# Patient Record
Sex: FEMALE | Race: WHITE | NOT HISPANIC OR LATINO | Employment: OTHER
[De-identification: names, ages, dates, MRNs, and addresses within clinical notes are randomized per-mention and may not be internally consistent; named-entity substitution may affect disease eponyms.]

---

## 2020-10-01 ENCOUNTER — OFFICE VISIT (OUTPATIENT)
Age: 55
End: 2020-10-01

## 2020-12-24 ENCOUNTER — OFFICE VISIT (OUTPATIENT)
Age: 55
End: 2020-12-24

## 2021-01-25 ENCOUNTER — OFFICE VISIT (OUTPATIENT)
Age: 56
End: 2021-01-25

## 2021-04-08 ENCOUNTER — OFFICE VISIT (OUTPATIENT)
Dept: URBAN - METROPOLITAN AREA CLINIC 7 | Facility: CLINIC | Age: 56
End: 2021-04-08

## 2021-04-08 ENCOUNTER — TELEPHONE ENCOUNTER (OUTPATIENT)
Dept: URBAN - METROPOLITAN AREA CLINIC 9 | Facility: CLINIC | Age: 56
End: 2021-04-08

## 2021-06-24 ENCOUNTER — TELEPHONE ENCOUNTER (OUTPATIENT)
Dept: URBAN - METROPOLITAN AREA CLINIC 9 | Facility: CLINIC | Age: 56
End: 2021-06-24

## 2021-07-22 ENCOUNTER — TELEPHONE ENCOUNTER (OUTPATIENT)
Dept: URBAN - METROPOLITAN AREA CLINIC 9 | Facility: CLINIC | Age: 56
End: 2021-07-22

## 2021-09-07 ENCOUNTER — TELEPHONE ENCOUNTER (OUTPATIENT)
Dept: URBAN - METROPOLITAN AREA CLINIC 9 | Facility: CLINIC | Age: 56
End: 2021-09-07

## 2021-09-13 ENCOUNTER — TELEPHONE ENCOUNTER (OUTPATIENT)
Dept: URBAN - METROPOLITAN AREA CLINIC 9 | Facility: CLINIC | Age: 56
End: 2021-09-13

## 2021-09-13 ENCOUNTER — OFFICE VISIT (OUTPATIENT)
Dept: URBAN - METROPOLITAN AREA CLINIC 7 | Facility: CLINIC | Age: 56
End: 2021-09-13

## 2021-09-21 ENCOUNTER — TELEPHONE ENCOUNTER (OUTPATIENT)
Dept: URBAN - METROPOLITAN AREA CLINIC 9 | Facility: CLINIC | Age: 56
End: 2021-09-21

## 2021-10-08 ENCOUNTER — OFFICE VISIT (OUTPATIENT)
Dept: URBAN - METROPOLITAN AREA CLINIC 7 | Facility: CLINIC | Age: 56
End: 2021-10-08

## 2021-10-19 ENCOUNTER — OFFICE VISIT (OUTPATIENT)
Dept: URBAN - METROPOLITAN AREA CLINIC 7 | Facility: CLINIC | Age: 56
End: 2021-10-19

## 2021-10-20 ENCOUNTER — OFFICE VISIT (OUTPATIENT)
Dept: URBAN - METROPOLITAN AREA SURGERY CENTER 5 | Facility: SURGERY CENTER | Age: 56
End: 2021-10-20

## 2021-10-25 ENCOUNTER — OFFICE VISIT (OUTPATIENT)
Dept: URBAN - METROPOLITAN AREA SURGERY CENTER 5 | Facility: SURGERY CENTER | Age: 56
End: 2021-10-25

## 2021-11-02 ENCOUNTER — TELEPHONE ENCOUNTER (OUTPATIENT)
Dept: URBAN - METROPOLITAN AREA CLINIC 9 | Facility: CLINIC | Age: 56
End: 2021-11-02

## 2021-11-16 ENCOUNTER — TELEPHONE ENCOUNTER (OUTPATIENT)
Dept: URBAN - METROPOLITAN AREA CLINIC 9 | Facility: CLINIC | Age: 56
End: 2021-11-16

## 2021-11-18 ENCOUNTER — TELEPHONE ENCOUNTER (OUTPATIENT)
Dept: URBAN - METROPOLITAN AREA CLINIC 9 | Facility: CLINIC | Age: 56
End: 2021-11-18

## 2021-12-14 ENCOUNTER — OFFICE VISIT (OUTPATIENT)
Dept: URBAN - METROPOLITAN AREA CLINIC 7 | Facility: CLINIC | Age: 56
End: 2021-12-14

## 2021-12-21 ENCOUNTER — TELEPHONE ENCOUNTER (OUTPATIENT)
Dept: URBAN - METROPOLITAN AREA CLINIC 9 | Facility: CLINIC | Age: 56
End: 2021-12-21

## 2021-12-22 ENCOUNTER — TELEPHONE ENCOUNTER (OUTPATIENT)
Dept: URBAN - METROPOLITAN AREA CLINIC 9 | Facility: CLINIC | Age: 56
End: 2021-12-22

## 2022-01-07 ENCOUNTER — TELEPHONE ENCOUNTER (OUTPATIENT)
Dept: URBAN - METROPOLITAN AREA CLINIC 9 | Facility: CLINIC | Age: 57
End: 2022-01-07

## 2022-07-30 ENCOUNTER — TELEPHONE ENCOUNTER (OUTPATIENT)
Age: 57
End: 2022-07-30

## 2022-07-30 RX ORDER — BUDESONIDE 3 MG/1
1 (ONE) CAPSULE, COATED PELLETS ORAL AS DIRECTED
Qty: 0 | Refills: 2 | OUTPATIENT
Start: 2021-10-25 | End: 2021-12-06

## 2022-07-30 RX ORDER — MESALAMINE 4 G/60ML
1 ENEMA RECTAL AT BEDTIME
Qty: 0 | Refills: 4 | OUTPATIENT
Start: 2021-09-21 | End: 2021-12-14

## 2022-07-30 RX ORDER — OMEPRAZOLE 20 MG/1
1 (ONE) CAPSULE, DELAYED RELEASE ORAL DAILY
Qty: 0 | Refills: 16 | OUTPATIENT
Start: 2021-04-08 | End: 2021-07-22

## 2022-07-30 RX ORDER — PREDNISONE 5 MG/1
1 (ONE) TABLET ORAL AS DIRECTED
Qty: 0 | Refills: 2 | OUTPATIENT
Start: 2021-09-13 | End: 2021-10-11

## 2022-07-31 ENCOUNTER — TELEPHONE ENCOUNTER (OUTPATIENT)
Age: 57
End: 2022-07-31

## 2022-07-31 RX ORDER — MESALAMINE 1.2 G/1
4 TABLET, DELAYED RELEASE ORAL DAILY
Qty: 0 | Refills: 2 | Status: ACTIVE | COMMUNITY
Start: 2022-01-07

## 2022-07-31 RX ORDER — BUDESONIDE 3 MG/1
1 (ONE) CAPSULE, COATED PELLETS ORAL AS DIRECTED
Qty: 0 | Refills: 2 | Status: ACTIVE | COMMUNITY
Start: 2021-10-25

## 2022-07-31 RX ORDER — MESALAMINE 1.2 G/1
4 TABLET, DELAYED RELEASE ORAL DAILY
Qty: 0 | Refills: 5 | Status: ACTIVE | COMMUNITY
Start: 2021-12-22

## 2022-07-31 RX ORDER — MESALAMINE 1.2 G/1
2 (TWO) TABLET, DELAYED RELEASE ORAL DAILY
Qty: 0 | Refills: 5 | Status: ACTIVE | COMMUNITY
Start: 2021-06-24

## 2022-07-31 RX ORDER — OMEPRAZOLE 40 MG/1
1 CAPSULE, DELAYED RELEASE ORAL
Qty: 0 | Refills: 5 | Status: ACTIVE | COMMUNITY
Start: 2021-07-22

## 2022-07-31 RX ORDER — MESALAMINE 1.2 G/1
4 TABLET, DELAYED RELEASE ORAL DAILY
Qty: 0 | Refills: 5 | Status: ACTIVE | COMMUNITY
Start: 2021-09-21

## 2022-07-31 RX ORDER — OMEPRAZOLE 20 MG/1
1 (ONE) CAPSULE, DELAYED RELEASE ORAL DAILY
Qty: 0 | Refills: 16 | Status: ACTIVE | COMMUNITY
Start: 2021-04-08

## 2022-07-31 RX ORDER — MESALAMINE 4 G/60ML
1 ENEMA RECTAL AT BEDTIME
Qty: 0 | Refills: 4 | Status: ACTIVE | COMMUNITY
Start: 2021-09-21

## 2022-07-31 RX ORDER — PREDNISONE 5 MG/1
1 (ONE) TABLET ORAL
Qty: 0 | Refills: 2 | Status: ACTIVE | COMMUNITY
Start: 2021-09-13

## 2022-08-03 ENCOUNTER — TELEPHONE ENCOUNTER (OUTPATIENT)
Dept: URBAN - METROPOLITAN AREA CLINIC 9 | Facility: CLINIC | Age: 57
End: 2022-08-03

## 2022-08-03 RX ORDER — OMEPRAZOLE 40 MG/1
1 CAPSULE, DELAYED RELEASE ORAL
Qty: 0 | Refills: 5
Start: 2021-07-22

## 2022-08-03 RX ORDER — OMEPRAZOLE 40 MG/1
1 CAPSULE, DELAYED RELEASE ORAL
Qty: 30 | Refills: 11

## 2022-11-28 ENCOUNTER — OFFICE VISIT (OUTPATIENT)
Dept: URBAN - METROPOLITAN AREA CLINIC 7 | Facility: CLINIC | Age: 57
End: 2022-11-28
Payer: MEDICARE

## 2022-11-28 VITALS
TEMPERATURE: 97.7 F | SYSTOLIC BLOOD PRESSURE: 112 MMHG | RESPIRATION RATE: 16 BRPM | WEIGHT: 186 LBS | BODY MASS INDEX: 32.96 KG/M2 | DIASTOLIC BLOOD PRESSURE: 70 MMHG | HEIGHT: 63 IN

## 2022-11-28 DIAGNOSIS — Z98.84 GASTRIC BYPASS STATUS FOR OBESITY: ICD-10-CM

## 2022-11-28 DIAGNOSIS — K51.90 ULCERATIVE COLITIS: ICD-10-CM

## 2022-11-28 PROCEDURE — 99214 OFFICE O/P EST MOD 30 MIN: CPT | Performed by: INTERNAL MEDICINE

## 2022-11-28 RX ORDER — AMITRIPTYLINE HYDROCHLORIDE 50 MG/1
TAKE ONE TABLET BY MOUTH AT BEDTIME FOR 30 DAYS TABLET, FILM COATED ORAL
Qty: 30 UNSPECIFIED | Refills: 0 | Status: ACTIVE | COMMUNITY

## 2022-11-28 RX ORDER — OMEPRAZOLE 40 MG/1
1 CAPSULE, DELAYED RELEASE ORAL
Qty: 30 | Refills: 11 | Status: ACTIVE | COMMUNITY

## 2022-11-28 RX ORDER — HYDROCODONE BITARTRATE AND ACETAMINOPHEN 5; 325 MG/1; MG/1
TAKE ONE TABLET BY MOUTH ONE TIME DAILY AS NEEDED FOR PAIN TABLET ORAL
Qty: 30 UNSPECIFIED | Refills: 0 | Status: ACTIVE | COMMUNITY

## 2022-11-28 RX ORDER — QUETIAPINE FUMARATE 25 MG/1
TAKE TWO TABLETS BY MOUTH ONE TIME DAILY AT BEDTIME FOR 30 DAYS TABLET ORAL
Qty: 60 UNSPECIFIED | Refills: 0 | Status: ACTIVE | COMMUNITY

## 2022-11-28 RX ORDER — MESALAMINE 1.2 G/1
4 TABLET, DELAYED RELEASE ORAL DAILY
Qty: 0 | Refills: 5 | Status: ACTIVE | COMMUNITY
Start: 2021-09-21

## 2022-11-28 RX ORDER — PREDNISONE 5 MG/1
1 (ONE) TABLET ORAL
Qty: 0 | Refills: 2 | Status: DISCONTINUED | COMMUNITY
Start: 2021-09-13

## 2022-11-28 RX ORDER — METHOCARBAMOL TABLETS 750 MG/1
TAKE ONE TABLET BY MOUTH THREE TIMES A DAY AS NEEDED TABLET, COATED ORAL
Qty: 90 UNSPECIFIED | Refills: 0 | Status: ACTIVE | COMMUNITY

## 2022-11-28 RX ORDER — LEVOTHYROXINE SODIUM 75 UG/1
TAKE ONE TABLET BY MOUTH ON MONDAY THROUGH SATURDAY AND TAKE TWO TABLETS BY MOUTH ON SUNDAY TAKE IN THE EARLY MORNING ON AN EMPTY STOMACH FO TABLET ORAL
Qty: 102 UNSPECIFIED | Refills: 0 | Status: ACTIVE | COMMUNITY

## 2022-11-28 RX ORDER — OMEPRAZOLE 20 MG/1
1 (ONE) CAPSULE, DELAYED RELEASE ORAL DAILY
Qty: 0 | Refills: 16 | Status: DISCONTINUED | COMMUNITY
Start: 2021-04-08

## 2022-11-28 RX ORDER — PREGABALIN 50 MG/1
TAKE ONE CAPSULE BY MOUTH TWICE A DAY CAPSULE ORAL
Qty: 60 UNSPECIFIED | Refills: 1 | Status: ACTIVE | COMMUNITY

## 2022-11-28 RX ORDER — MESALAMINE 4 G/60ML
1 ENEMA RECTAL AT BEDTIME
Qty: 0 | Refills: 4 | Status: DISCONTINUED | COMMUNITY
Start: 2021-09-21

## 2022-11-28 RX ORDER — BUDESONIDE 3 MG/1
1 (ONE) CAPSULE, COATED PELLETS ORAL AS DIRECTED
Qty: 0 | Refills: 2 | Status: DISCONTINUED | COMMUNITY
Start: 2021-10-25

## 2022-11-28 RX ORDER — CLONAZEPAM 0.5 MG/1
TAKE ONE TABLET BY MOUTH TWICE A DAY TABLET ORAL
Qty: 60 UNSPECIFIED | Refills: 0 | Status: ACTIVE | COMMUNITY

## 2022-11-28 RX ORDER — FAMOTIDINE 20 MG/1
1 TABLET AT BEDTIME AS NEEDED TABLET, FILM COATED ORAL ONCE A DAY
Status: ACTIVE | COMMUNITY

## 2022-11-28 NOTE — HPI-TODAY'S VISIT:
Patient was last seen in the office in December 2021.  She is a history of ulcerative colitis which was diagnosed at the age of 30 and mainly was proctitis but eventually became pancolitis.  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  Hemoglobin normal in 2020.  CT in March 2020 showed stool in the colon.  Colon in September 2020 with biopsies from throughout were negative for inflammation and no polyps.  She has been maintained on mesalamine products and at the time of her last visit had not had a flare in about 3 years up until 2021.  Had not been exposed to steroids in many years and has never been exposed to Biologics.  She is on turmeric.  She has a history of anal fissures with this enterotomy twice in the past for this.  She is also had a history of gastrojejunal anastomotic ulcer for which she has required dilation 12-13 times from 2012 to several years ago but has been fine from this perspective for a while.  She has also had a history of gastroesophageal reflux treated with omeprazole 40 mg daily.  Calprotectin in September 2021 was 991, ESR 28, normal hemoglobin, normal iron, normal CRP, normal liver function test.  I did a prednisone taper for 4 weeks, maximum oral mesalamine dose, and 5-ASA enemas and she improved but was still having some symptoms so I pursued a flexible sigmoidoscopy in late 2021 which demonstrated [place] 1 colitis in the colon.  She was treated with budesonide.  She had improved with semiformed stools and no bleeding after 2 weeks of budesonide although she is still having 5-6 bowel moods a day and some joint pains.  She was having dietary indiscretions because she was going to lots of social outings.  I still did feel at that office visit that she had some clinical disease and she was only on p.o. mesalamine.  At that point, did pursue further lab testing which demonstrated a negative C. difficile negative crypto and a calprotectin of 79 which was much improved.  She recently called to ask for refill on her omeprazole.  Otherwise has been pretty quiet from a colitis perspective in 2022.  Follow-up now. She is doing well from a colitis perspective. Has some diet related diarrheal symptoms, but otherwise okay. No bleeding. 4 tabs mesalamine per day.

## 2022-12-19 ENCOUNTER — TELEPHONE ENCOUNTER (OUTPATIENT)
Dept: URBAN - METROPOLITAN AREA CLINIC 8 | Facility: CLINIC | Age: 57
End: 2022-12-19

## 2022-12-19 RX ORDER — QUETIAPINE FUMARATE 25 MG/1
TAKE TWO TABLETS BY MOUTH ONE TIME DAILY AT BEDTIME FOR 30 DAYS TABLET ORAL
Qty: 60 UNSPECIFIED | Refills: 0 | Status: ACTIVE | COMMUNITY

## 2022-12-19 RX ORDER — PREGABALIN 50 MG/1
TAKE ONE CAPSULE BY MOUTH TWICE A DAY CAPSULE ORAL
Qty: 60 UNSPECIFIED | Refills: 1 | Status: ACTIVE | COMMUNITY

## 2022-12-19 RX ORDER — LEVOTHYROXINE SODIUM 75 UG/1
TAKE ONE TABLET BY MOUTH ON MONDAY THROUGH SATURDAY AND TAKE TWO TABLETS BY MOUTH ON SUNDAY TAKE IN THE EARLY MORNING ON AN EMPTY STOMACH FO TABLET ORAL
Qty: 102 UNSPECIFIED | Refills: 0 | Status: ACTIVE | COMMUNITY

## 2022-12-19 RX ORDER — MESALAMINE 1.2 G/1
2 TABLETS WITH A MEAL TABLET, DELAYED RELEASE ORAL TWICE A DAY
Qty: 120 TABLET | Refills: 11 | OUTPATIENT

## 2022-12-19 RX ORDER — CLONAZEPAM 0.5 MG/1
TAKE ONE TABLET BY MOUTH TWICE A DAY TABLET ORAL
Qty: 60 UNSPECIFIED | Refills: 0 | Status: ACTIVE | COMMUNITY

## 2022-12-19 RX ORDER — AMITRIPTYLINE HYDROCHLORIDE 50 MG/1
TAKE ONE TABLET BY MOUTH AT BEDTIME FOR 30 DAYS TABLET, FILM COATED ORAL
Qty: 30 UNSPECIFIED | Refills: 0 | Status: ACTIVE | COMMUNITY

## 2022-12-19 RX ORDER — HYDROCODONE BITARTRATE AND ACETAMINOPHEN 5; 325 MG/1; MG/1
TAKE ONE TABLET BY MOUTH ONE TIME DAILY AS NEEDED FOR PAIN TABLET ORAL
Qty: 30 UNSPECIFIED | Refills: 0 | Status: ACTIVE | COMMUNITY

## 2022-12-19 RX ORDER — MESALAMINE 1.2 G/1
4 TABLET, DELAYED RELEASE ORAL DAILY
Qty: 0 | Refills: 5 | Status: ACTIVE | COMMUNITY
Start: 2021-09-21

## 2022-12-19 RX ORDER — FAMOTIDINE 20 MG/1
1 TABLET AT BEDTIME AS NEEDED TABLET, FILM COATED ORAL ONCE A DAY
Status: ACTIVE | COMMUNITY

## 2022-12-19 RX ORDER — OMEPRAZOLE 40 MG/1
1 CAPSULE, DELAYED RELEASE ORAL
Qty: 30 | Refills: 11 | Status: ACTIVE | COMMUNITY

## 2022-12-19 RX ORDER — METHOCARBAMOL TABLETS 750 MG/1
TAKE ONE TABLET BY MOUTH THREE TIMES A DAY AS NEEDED TABLET, COATED ORAL
Qty: 90 UNSPECIFIED | Refills: 0 | Status: ACTIVE | COMMUNITY

## 2023-03-28 ENCOUNTER — TELEPHONE ENCOUNTER (OUTPATIENT)
Dept: URBAN - METROPOLITAN AREA SURGERY CENTER 5 | Facility: SURGERY CENTER | Age: 58
End: 2023-03-28

## 2023-03-28 ENCOUNTER — DASHBOARD ENCOUNTERS (OUTPATIENT)
Age: 58
End: 2023-03-28

## 2023-03-28 VITALS — HEIGHT: 63 IN | BODY MASS INDEX: 27.82 KG/M2 | WEIGHT: 157 LBS

## 2023-03-28 RX ORDER — AMITRIPTYLINE HYDROCHLORIDE 50 MG/1
TAKE ONE TABLET BY MOUTH AT BEDTIME FOR 30 DAYS TABLET, FILM COATED ORAL
Qty: 30 UNSPECIFIED | Refills: 0 | Status: ACTIVE | COMMUNITY

## 2023-03-28 RX ORDER — MESALAMINE 1.2 G/1
4 TABLETS WITH A MEAL TABLET, DELAYED RELEASE ORAL DAILY
Qty: 360 | Refills: 3
Start: 2021-09-21 | End: 2024-03-24

## 2023-03-28 RX ORDER — QUETIAPINE FUMARATE 25 MG/1
TAKE TWO TABLETS BY MOUTH ONE TIME DAILY AT BEDTIME FOR 30 DAYS TABLET ORAL
Qty: 60 UNSPECIFIED | Refills: 0 | Status: ACTIVE | COMMUNITY

## 2023-03-28 RX ORDER — OMEPRAZOLE 40 MG/1
1 CAPSULE, DELAYED RELEASE ORAL
Qty: 30 | Refills: 11 | Status: ACTIVE | COMMUNITY

## 2023-03-28 RX ORDER — HYDROCODONE BITARTRATE AND ACETAMINOPHEN 5; 325 MG/1; MG/1
TAKE ONE TABLET BY MOUTH ONE TIME DAILY AS NEEDED FOR PAIN TABLET ORAL
Qty: 30 UNSPECIFIED | Refills: 0 | Status: ACTIVE | COMMUNITY

## 2023-03-28 RX ORDER — FAMOTIDINE 20 MG/1
1 TABLET AT BEDTIME AS NEEDED TABLET, FILM COATED ORAL ONCE A DAY
Status: ACTIVE | COMMUNITY

## 2023-03-28 RX ORDER — MESALAMINE 1.2 G/1
4 TABLET, DELAYED RELEASE ORAL DAILY
Qty: 0 | Refills: 5 | Status: ACTIVE | COMMUNITY
Start: 2021-09-21

## 2023-03-28 RX ORDER — LEVOTHYROXINE SODIUM 75 UG/1
TAKE ONE TABLET BY MOUTH ON MONDAY THROUGH SATURDAY AND TAKE TWO TABLETS BY MOUTH ON SUNDAY TAKE IN THE EARLY MORNING ON AN EMPTY STOMACH FO TABLET ORAL
Qty: 102 UNSPECIFIED | Refills: 0 | Status: ACTIVE | COMMUNITY

## 2023-03-28 RX ORDER — CLONAZEPAM 0.5 MG/1
TAKE ONE TABLET BY MOUTH TWICE A DAY TABLET ORAL
Qty: 60 UNSPECIFIED | Refills: 0 | Status: ACTIVE | COMMUNITY

## 2023-03-28 RX ORDER — PREGABALIN 50 MG/1
TAKE ONE CAPSULE BY MOUTH TWICE A DAY CAPSULE ORAL
Qty: 60 UNSPECIFIED | Refills: 1 | Status: ACTIVE | COMMUNITY

## 2023-03-28 RX ORDER — MESALAMINE 1.2 G/1
2 TABLETS WITH A MEAL TABLET, DELAYED RELEASE ORAL TWICE A DAY
Qty: 120 TABLET | Refills: 11 | Status: ON HOLD | COMMUNITY

## 2023-03-28 RX ORDER — METHOCARBAMOL TABLETS 750 MG/1
TAKE ONE TABLET BY MOUTH THREE TIMES A DAY AS NEEDED TABLET, COATED ORAL
Qty: 90 UNSPECIFIED | Refills: 0 | Status: ACTIVE | COMMUNITY

## 2023-04-07 ENCOUNTER — TELEPHONE ENCOUNTER (OUTPATIENT)
Dept: URBAN - METROPOLITAN AREA CLINIC 7 | Facility: CLINIC | Age: 58
End: 2023-04-07

## 2023-04-12 ENCOUNTER — OFFICE VISIT (OUTPATIENT)
Dept: URBAN - METROPOLITAN AREA SURGERY CENTER 5 | Facility: SURGERY CENTER | Age: 58
End: 2023-04-12

## 2023-05-11 ENCOUNTER — TELEPHONE ENCOUNTER (OUTPATIENT)
Dept: URBAN - METROPOLITAN AREA CLINIC 7 | Facility: CLINIC | Age: 58
End: 2023-05-11

## 2023-05-11 ENCOUNTER — LAB OUTSIDE AN ENCOUNTER (OUTPATIENT)
Dept: URBAN - METROPOLITAN AREA CLINIC 7 | Facility: CLINIC | Age: 58
End: 2023-05-11

## 2023-05-12 ENCOUNTER — TELEPHONE ENCOUNTER (OUTPATIENT)
Dept: URBAN - METROPOLITAN AREA SURGERY CENTER 5 | Facility: SURGERY CENTER | Age: 58
End: 2023-05-12

## 2023-05-15 ENCOUNTER — OFFICE VISIT (OUTPATIENT)
Dept: URBAN - METROPOLITAN AREA SURGERY CENTER 5 | Facility: SURGERY CENTER | Age: 58
End: 2023-05-15
Payer: MEDICARE

## 2023-05-15 ENCOUNTER — CLAIMS CREATED FROM THE CLAIM WINDOW (OUTPATIENT)
Dept: URBAN - METROPOLITAN AREA CLINIC 4 | Facility: CLINIC | Age: 58
End: 2023-05-15
Payer: MEDICARE

## 2023-05-15 DIAGNOSIS — K51.00 ULCERATIVE (CHRONIC) PANCOLITIS WITHOUT COMPLICATIONS: ICD-10-CM

## 2023-05-15 DIAGNOSIS — Z12.11 AVERAGE RISK FOR CRC. DUE TO PT'S CO-MORBID STATE WITH END STAGE DEMENTIA, HIGH RISK FOR ANESTHESIA (PER NEUROLOGY); INABILITY TO TAKE A BOWEL PREP....WOULD NOT ADVISE ANY COLORECTAL CANCER SCREENING INCLUDING STOOL TEST FOR FECAL BLOOD.: ICD-10-CM

## 2023-05-15 DIAGNOSIS — K63.89 OTHER SPECIFIED DISEASES OF INTESTINE: ICD-10-CM

## 2023-05-15 DIAGNOSIS — K62.89 OTHER SPECIFIED DISEASES OF ANUS AND RECTUM: ICD-10-CM

## 2023-05-15 DIAGNOSIS — K64.8 EXTERNAL HEMORRHOIDS: ICD-10-CM

## 2023-05-15 PROBLEM — 442159003: Status: ACTIVE | Noted: 2023-05-15

## 2023-05-15 PROCEDURE — 45380 COLONOSCOPY AND BIOPSY: CPT | Performed by: INTERNAL MEDICINE

## 2023-05-15 PROCEDURE — 45380 COLONOSCOPY AND BIOPSY: CPT | Performed by: CLINIC/CENTER

## 2023-05-15 PROCEDURE — 88305 TISSUE EXAM BY PATHOLOGIST: CPT | Performed by: PATHOLOGY

## 2023-05-15 RX ORDER — CLONAZEPAM 0.5 MG/1
TAKE ONE TABLET BY MOUTH TWICE A DAY TABLET ORAL
Qty: 60 UNSPECIFIED | Refills: 0 | Status: ACTIVE | COMMUNITY

## 2023-05-15 RX ORDER — AMITRIPTYLINE HYDROCHLORIDE 50 MG/1
TAKE ONE TABLET BY MOUTH AT BEDTIME FOR 30 DAYS TABLET, FILM COATED ORAL
Qty: 30 UNSPECIFIED | Refills: 0 | Status: ACTIVE | COMMUNITY

## 2023-05-15 RX ORDER — FAMOTIDINE 20 MG/1
1 TABLET AT BEDTIME AS NEEDED TABLET, FILM COATED ORAL ONCE A DAY
Status: ACTIVE | COMMUNITY

## 2023-05-15 RX ORDER — MESALAMINE 1.2 G/1
4 TABLETS WITH A MEAL TABLET, DELAYED RELEASE ORAL DAILY
Qty: 360 | Refills: 3 | Status: ACTIVE | COMMUNITY
Start: 2021-09-21 | End: 2024-03-24

## 2023-05-15 RX ORDER — MESALAMINE 1.2 G/1
2 TABLETS WITH A MEAL TABLET, DELAYED RELEASE ORAL TWICE A DAY
Qty: 120 TABLET | Refills: 11 | Status: ON HOLD | COMMUNITY

## 2023-05-15 RX ORDER — LEVOTHYROXINE SODIUM 75 UG/1
TAKE ONE TABLET BY MOUTH ON MONDAY THROUGH SATURDAY AND TAKE TWO TABLETS BY MOUTH ON SUNDAY TAKE IN THE EARLY MORNING ON AN EMPTY STOMACH FO TABLET ORAL
Qty: 102 UNSPECIFIED | Refills: 0 | Status: ACTIVE | COMMUNITY

## 2023-05-15 RX ORDER — METHOCARBAMOL TABLETS 750 MG/1
TAKE ONE TABLET BY MOUTH THREE TIMES A DAY AS NEEDED TABLET, COATED ORAL
Qty: 90 UNSPECIFIED | Refills: 0 | Status: ACTIVE | COMMUNITY

## 2023-05-15 RX ORDER — HYDROCODONE BITARTRATE AND ACETAMINOPHEN 5; 325 MG/1; MG/1
TAKE ONE TABLET BY MOUTH ONE TIME DAILY AS NEEDED FOR PAIN TABLET ORAL
Qty: 30 UNSPECIFIED | Refills: 0 | Status: ACTIVE | COMMUNITY

## 2023-05-15 RX ORDER — PREGABALIN 50 MG/1
TAKE ONE CAPSULE BY MOUTH TWICE A DAY CAPSULE ORAL
Qty: 60 UNSPECIFIED | Refills: 1 | Status: ACTIVE | COMMUNITY

## 2023-05-15 RX ORDER — QUETIAPINE FUMARATE 25 MG/1
TAKE TWO TABLETS BY MOUTH ONE TIME DAILY AT BEDTIME FOR 30 DAYS TABLET ORAL
Qty: 60 UNSPECIFIED | Refills: 0 | Status: ACTIVE | COMMUNITY

## 2023-05-15 RX ORDER — OMEPRAZOLE 40 MG/1
1 CAPSULE, DELAYED RELEASE ORAL
Qty: 30 | Refills: 11 | Status: ACTIVE | COMMUNITY

## 2023-06-28 ENCOUNTER — TELEPHONE ENCOUNTER (OUTPATIENT)
Dept: URBAN - METROPOLITAN AREA CLINIC 7 | Facility: CLINIC | Age: 58
End: 2023-06-28

## 2023-06-28 RX ORDER — OMEPRAZOLE 40 MG/1
1 CAPSULE, DELAYED RELEASE ORAL
Qty: 30 | Refills: 11

## 2023-07-19 ENCOUNTER — TELEPHONE ENCOUNTER (OUTPATIENT)
Dept: URBAN - METROPOLITAN AREA CLINIC 7 | Facility: CLINIC | Age: 58
End: 2023-07-19

## 2023-07-19 PROBLEM — 85919009: Status: ACTIVE | Noted: 2023-07-19

## 2023-07-19 RX ORDER — CLONAZEPAM 0.5 MG/1
TAKE ONE TABLET BY MOUTH TWICE A DAY TABLET ORAL
Qty: 60 UNSPECIFIED | Refills: 0 | Status: ACTIVE | COMMUNITY

## 2023-07-19 RX ORDER — LOPERAMIDE HYDROCHLORIDE 2 MG/1
2 CAPSULES IN THE MORNING AS NEEDED, THEN 1 ADDITIONAL CAPSULE EVERY 8 HOURS AS NEEDED CAPSULE ORAL AS DIRECTED
Qty: 30 | Refills: 3 | OUTPATIENT
Start: 2023-07-19 | End: 2023-08-28

## 2023-07-19 RX ORDER — MESALAMINE 1.2 G/1
2 TABLETS WITH A MEAL TABLET, DELAYED RELEASE ORAL TWICE A DAY
Qty: 120 TABLET | Refills: 11 | Status: ON HOLD | COMMUNITY

## 2023-07-19 RX ORDER — FAMOTIDINE 20 MG/1
1 TABLET AT BEDTIME AS NEEDED TABLET, FILM COATED ORAL ONCE A DAY
Status: ACTIVE | COMMUNITY

## 2023-07-19 RX ORDER — MESALAMINE 1.2 G/1
4 TABLETS WITH A MEAL TABLET, DELAYED RELEASE ORAL DAILY
Qty: 360 | Refills: 3 | Status: ACTIVE | COMMUNITY
Start: 2021-09-21 | End: 2024-03-24

## 2023-07-19 RX ORDER — HYDROCODONE BITARTRATE AND ACETAMINOPHEN 5; 325 MG/1; MG/1
TAKE ONE TABLET BY MOUTH ONE TIME DAILY AS NEEDED FOR PAIN TABLET ORAL
Qty: 30 UNSPECIFIED | Refills: 0 | Status: ACTIVE | COMMUNITY

## 2023-07-19 RX ORDER — PREGABALIN 50 MG/1
TAKE ONE CAPSULE BY MOUTH TWICE A DAY CAPSULE ORAL
Qty: 60 UNSPECIFIED | Refills: 1 | Status: ACTIVE | COMMUNITY

## 2023-07-19 RX ORDER — AMITRIPTYLINE HYDROCHLORIDE 50 MG/1
TAKE ONE TABLET BY MOUTH AT BEDTIME FOR 30 DAYS TABLET, FILM COATED ORAL
Qty: 30 UNSPECIFIED | Refills: 0 | Status: ACTIVE | COMMUNITY

## 2023-07-19 RX ORDER — QUETIAPINE FUMARATE 25 MG/1
TAKE TWO TABLETS BY MOUTH ONE TIME DAILY AT BEDTIME FOR 30 DAYS TABLET ORAL
Qty: 60 UNSPECIFIED | Refills: 0 | Status: ACTIVE | COMMUNITY

## 2023-07-19 RX ORDER — OMEPRAZOLE 40 MG/1
1 CAPSULE, DELAYED RELEASE ORAL
Qty: 30 | Refills: 11 | Status: ACTIVE | COMMUNITY

## 2023-07-19 RX ORDER — LEVOTHYROXINE SODIUM 75 UG/1
TAKE ONE TABLET BY MOUTH ON MONDAY THROUGH SATURDAY AND TAKE TWO TABLETS BY MOUTH ON SUNDAY TAKE IN THE EARLY MORNING ON AN EMPTY STOMACH FO TABLET ORAL
Qty: 102 UNSPECIFIED | Refills: 0 | Status: ACTIVE | COMMUNITY

## 2023-07-19 RX ORDER — METHOCARBAMOL TABLETS 750 MG/1
TAKE ONE TABLET BY MOUTH THREE TIMES A DAY AS NEEDED TABLET, COATED ORAL
Qty: 90 UNSPECIFIED | Refills: 0 | Status: ACTIVE | COMMUNITY

## 2023-07-24 ENCOUNTER — TELEPHONE ENCOUNTER (OUTPATIENT)
Dept: URBAN - METROPOLITAN AREA CLINIC 7 | Facility: CLINIC | Age: 58
End: 2023-07-24

## 2023-09-13 ENCOUNTER — TELEPHONE ENCOUNTER (OUTPATIENT)
Dept: URBAN - METROPOLITAN AREA CLINIC 7 | Facility: CLINIC | Age: 58
End: 2023-09-13

## 2024-06-19 ENCOUNTER — TELEPHONE ENCOUNTER (OUTPATIENT)
Dept: URBAN - METROPOLITAN AREA CLINIC 8 | Facility: CLINIC | Age: 59
End: 2024-06-19

## 2024-06-19 RX ORDER — OMEPRAZOLE 40 MG/1
1 CAPSULE, DELAYED RELEASE ORAL
Qty: 90 | Refills: 3

## 2025-01-15 ENCOUNTER — ERX REFILL RESPONSE (OUTPATIENT)
Dept: URBAN - METROPOLITAN AREA CLINIC 7 | Facility: CLINIC | Age: 60
End: 2025-01-15

## 2025-01-15 RX ORDER — MESALAMINE 1.2 G/1
TAKE 4 TABLETS ONCE DAILY  WITH A MEAL TABLET, DELAYED RELEASE ORAL
Qty: 360 TABLET | Refills: 3 | OUTPATIENT

## 2025-01-15 RX ORDER — MESALAMINE 1.2 G/1
4 TABLETS WITH A MEAL TABLET, DELAYED RELEASE ORAL ONCE A DAY
Qty: 360 TABLET | Refills: 3 | OUTPATIENT

## (undated) LAB
ALBUMIN SERUM: (no result)
ALBUMIN SERUM: (no result)
ALKALINE PHOSPHATASE: (no result)
ALKALINE PHOSPHATASE: (no result)
ALT (SGPT) (ALANINE AMINO TRANSFERASE): (no result)
ALT (SGPT) (ALANINE AMINO TRANSFERASE): (no result)
ASSAY FOR CALPROTECTIN FECAL: (no result)
AST (SGOT)  (ASPART AMINO TRANSFERASE): (no result)
AST (SGOT)  (ASPART AMINO TRANSFERASE): (no result)
BILIRUBIN, TOTAL: (no result)
BILIRUBIN, TOTAL: (no result)
BLOOD COUNT, PLATELET, AUTOMATED: (no result)
BLOOD COUNT, PLATELET, AUTOMATED: (no result)
BUN (BLOOD UREA NITROGEN): (no result)
BUN (BLOOD UREA NITROGEN): (no result)
C DIFF AMPLIFIED PROBE: (no result)
C-REACTIVE PROTEIN: (no result)
CALCIUM SERUM: (no result)
CALCIUM SERUM: (no result)
CHLORIDE BLOOD: (no result)
CHLORIDE BLOOD: (no result)
CREATININE BLOOD: (no result)
CREATININE BLOOD: (no result)
CRYPTOSPORIDUM/GIARDI, INFCT ANTIGEN: (no result)
ESR-F (SED RATE ERYTHROCYTE - FEMALE): (no result)
FERRITIN: (no result)
FERRITIN: (no result)
GLUCOSE: (no result)
GLUCOSE: (no result)
GRANULOCYTE %: (no result)
GRANULOCYTE %: (no result)
HCT (HEMATOCRIT): (no result)
HCT (HEMATOCRIT): (no result)
HGB (HEMOGLOBIN): (no result)
HGB (HEMOGLOBIN): (no result)
IRON BINDING CAPACITY (TIBC): (no result)
IRON BINDING CAPACITY (TIBC): (no result)
IRON: (no result)
IRON: (no result)
LYMPHOCYTES, TOTAL: (no result)
LYMPHOCYTES, TOTAL: (no result)
MCH (MEAN CORPUSCULAR HEMOGLOBIN): (no result)
MCH (MEAN CORPUSCULAR HEMOGLOBIN): (no result)
MCHC (MEAN CORPUSCULAR HEMOGLOBIN CONCENTRATI: (no result)
MCHC (MEAN CORPUSCULAR HEMOGLOBIN CONCENTRATI: (no result)
MCV (MEAN CORPUSCULAR VOLUME): (no result)
MCV (MEAN CORPUSCULAR VOLUME): (no result)
MONOCYTES: (no result)
MONOCYTES: (no result)
POTASSIUM SERUM: (no result)
POTASSIUM SERUM: (no result)
PROTEIN, TOTAL, SERUM: (no result)
PROTEIN, TOTAL, SERUM: (no result)
RBC: (no result)
RBC: (no result)
SODIUM SERUM: (no result)
SODIUM SERUM: (no result)
TB TEST, CELL IMMUN MEASURE: (no result)
WBC: (no result)
WBC: (no result)